# Patient Record
Sex: MALE | Race: ASIAN | NOT HISPANIC OR LATINO
[De-identification: names, ages, dates, MRNs, and addresses within clinical notes are randomized per-mention and may not be internally consistent; named-entity substitution may affect disease eponyms.]

---

## 2024-03-04 ENCOUNTER — TRANSCRIPTION ENCOUNTER (OUTPATIENT)
Age: 74
End: 2024-03-04

## 2024-03-05 ENCOUNTER — RESULT REVIEW (OUTPATIENT)
Age: 74
End: 2024-03-05

## 2024-03-05 ENCOUNTER — EMERGENCY (EMERGENCY)
Facility: HOSPITAL | Age: 74
LOS: 1 days | Discharge: HOME | End: 2024-03-05
Attending: EMERGENCY MEDICINE | Admitting: STUDENT IN AN ORGANIZED HEALTH CARE EDUCATION/TRAINING PROGRAM
Payer: MEDICARE

## 2024-03-05 VITALS
HEART RATE: 92 BPM | SYSTOLIC BLOOD PRESSURE: 114 MMHG | DIASTOLIC BLOOD PRESSURE: 77 MMHG | RESPIRATION RATE: 16 BRPM | OXYGEN SATURATION: 95 % | TEMPERATURE: 98 F

## 2024-03-05 LAB
ADD ON TEST-SPECIMEN IN LAB: SIGNIFICANT CHANGE UP
ALBUMIN SERPL ELPH-MCNC: 4.5 G/DL — SIGNIFICANT CHANGE UP (ref 3.3–5)
ALP SERPL-CCNC: 66 U/L — SIGNIFICANT CHANGE UP (ref 40–120)
ALT FLD-CCNC: 31 U/L — SIGNIFICANT CHANGE UP (ref 4–41)
ANION GAP SERPL CALC-SCNC: 14 MMOL/L — SIGNIFICANT CHANGE UP (ref 7–14)
AST SERPL-CCNC: 30 U/L — SIGNIFICANT CHANGE UP (ref 4–40)
BASOPHILS # BLD AUTO: 0.04 K/UL — SIGNIFICANT CHANGE UP (ref 0–0.2)
BASOPHILS NFR BLD AUTO: 0.3 % — SIGNIFICANT CHANGE UP (ref 0–2)
BILIRUB SERPL-MCNC: 1.2 MG/DL — SIGNIFICANT CHANGE UP (ref 0.2–1.2)
BUN SERPL-MCNC: 19 MG/DL — SIGNIFICANT CHANGE UP (ref 7–23)
CALCIUM SERPL-MCNC: 9.7 MG/DL — SIGNIFICANT CHANGE UP (ref 8.4–10.5)
CHLORIDE SERPL-SCNC: 92 MMOL/L — LOW (ref 98–107)
CO2 SERPL-SCNC: 24 MMOL/L — SIGNIFICANT CHANGE UP (ref 22–31)
CREAT SERPL-MCNC: 0.8 MG/DL — SIGNIFICANT CHANGE UP (ref 0.5–1.3)
EGFR: 95 ML/MIN/1.73M2 — SIGNIFICANT CHANGE UP
EOSINOPHIL # BLD AUTO: 0.02 K/UL — SIGNIFICANT CHANGE UP (ref 0–0.5)
EOSINOPHIL NFR BLD AUTO: 0.2 % — SIGNIFICANT CHANGE UP (ref 0–6)
GLUCOSE SERPL-MCNC: 118 MG/DL — HIGH (ref 70–99)
HCT VFR BLD CALC: 49.2 % — SIGNIFICANT CHANGE UP (ref 39–50)
HGB BLD-MCNC: 16.6 G/DL — SIGNIFICANT CHANGE UP (ref 13–17)
IANC: 11.28 K/UL — HIGH (ref 1.8–7.4)
IMM GRANULOCYTES NFR BLD AUTO: 0.5 % — SIGNIFICANT CHANGE UP (ref 0–0.9)
LYMPHOCYTES # BLD AUTO: 0.98 K/UL — LOW (ref 1–3.3)
LYMPHOCYTES # BLD AUTO: 7.4 % — LOW (ref 13–44)
MCHC RBC-ENTMCNC: 32.2 PG — SIGNIFICANT CHANGE UP (ref 27–34)
MCHC RBC-ENTMCNC: 33.7 GM/DL — SIGNIFICANT CHANGE UP (ref 32–36)
MCV RBC AUTO: 95.5 FL — SIGNIFICANT CHANGE UP (ref 80–100)
MONOCYTES # BLD AUTO: 0.84 K/UL — SIGNIFICANT CHANGE UP (ref 0–0.9)
MONOCYTES NFR BLD AUTO: 6.3 % — SIGNIFICANT CHANGE UP (ref 2–14)
NEUTROPHILS # BLD AUTO: 11.28 K/UL — HIGH (ref 1.8–7.4)
NEUTROPHILS NFR BLD AUTO: 85.3 % — HIGH (ref 43–77)
NRBC # BLD: 0 /100 WBCS — SIGNIFICANT CHANGE UP (ref 0–0)
NRBC # FLD: 0 K/UL — SIGNIFICANT CHANGE UP (ref 0–0)
PLATELET # BLD AUTO: 221 K/UL — SIGNIFICANT CHANGE UP (ref 150–400)
POTASSIUM SERPL-MCNC: 4.4 MMOL/L — SIGNIFICANT CHANGE UP (ref 3.5–5.3)
POTASSIUM SERPL-SCNC: 4.4 MMOL/L — SIGNIFICANT CHANGE UP (ref 3.5–5.3)
PROT SERPL-MCNC: 7.6 G/DL — SIGNIFICANT CHANGE UP (ref 6–8.3)
RBC # BLD: 5.15 M/UL — SIGNIFICANT CHANGE UP (ref 4.2–5.8)
RBC # FLD: 12 % — SIGNIFICANT CHANGE UP (ref 10.3–14.5)
SODIUM SERPL-SCNC: 130 MMOL/L — LOW (ref 135–145)
TROPONIN T, HIGH SENSITIVITY RESULT: 12 NG/L — SIGNIFICANT CHANGE UP
TROPONIN T, HIGH SENSITIVITY RESULT: 16 NG/L — SIGNIFICANT CHANGE UP
WBC # BLD: 13.23 K/UL — HIGH (ref 3.8–10.5)
WBC # FLD AUTO: 13.23 K/UL — HIGH (ref 3.8–10.5)

## 2024-03-05 PROCEDURE — 99283 EMERGENCY DEPT VISIT LOW MDM: CPT

## 2024-03-05 PROCEDURE — 70450 CT HEAD/BRAIN W/O DYE: CPT | Mod: 26,MC

## 2024-03-05 PROCEDURE — 71046 X-RAY EXAM CHEST 2 VIEWS: CPT | Mod: 26

## 2024-03-05 PROCEDURE — 70450 CT HEAD/BRAIN W/O DYE: CPT | Mod: 26,MC,77

## 2024-03-05 PROCEDURE — 93306 TTE W/DOPPLER COMPLETE: CPT | Mod: 26

## 2024-03-05 PROCEDURE — 99223 1ST HOSP IP/OBS HIGH 75: CPT

## 2024-03-05 PROCEDURE — 93010 ELECTROCARDIOGRAM REPORT: CPT

## 2024-03-05 RX ORDER — LEVOTHYROXINE SODIUM 125 MCG
100 TABLET ORAL DAILY
Refills: 0 | Status: DISCONTINUED | OUTPATIENT
Start: 2024-03-05 | End: 2024-03-08

## 2024-03-05 RX ORDER — SODIUM CHLORIDE 9 MG/ML
1000 INJECTION INTRAMUSCULAR; INTRAVENOUS; SUBCUTANEOUS ONCE
Refills: 0 | Status: COMPLETED | OUTPATIENT
Start: 2024-03-05 | End: 2024-03-05

## 2024-03-05 RX ORDER — ATORVASTATIN CALCIUM 80 MG/1
20 TABLET, FILM COATED ORAL AT BEDTIME
Refills: 0 | Status: DISCONTINUED | OUTPATIENT
Start: 2024-03-05 | End: 2024-03-08

## 2024-03-05 RX ORDER — DILTIAZEM HCL 120 MG
120 CAPSULE, EXT RELEASE 24 HR ORAL DAILY
Refills: 0 | Status: DISCONTINUED | OUTPATIENT
Start: 2024-03-05 | End: 2024-03-08

## 2024-03-05 RX ORDER — FINASTERIDE 5 MG/1
5 TABLET, FILM COATED ORAL DAILY
Refills: 0 | Status: DISCONTINUED | OUTPATIENT
Start: 2024-03-05 | End: 2024-03-08

## 2024-03-05 RX ORDER — TETANUS TOXOID, REDUCED DIPHTHERIA TOXOID AND ACELLULAR PERTUSSIS VACCINE, ADSORBED 5; 2.5; 8; 8; 2.5 [IU]/.5ML; [IU]/.5ML; UG/.5ML; UG/.5ML; UG/.5ML
0.5 SUSPENSION INTRAMUSCULAR ONCE
Refills: 0 | Status: COMPLETED | OUTPATIENT
Start: 2024-03-05 | End: 2024-03-05

## 2024-03-05 RX ORDER — ALLOPURINOL 300 MG
300 TABLET ORAL DAILY
Refills: 0 | Status: DISCONTINUED | OUTPATIENT
Start: 2024-03-05 | End: 2024-03-08

## 2024-03-05 RX ORDER — ACETAMINOPHEN 500 MG
975 TABLET ORAL ONCE
Refills: 0 | Status: COMPLETED | OUTPATIENT
Start: 2024-03-05 | End: 2024-03-05

## 2024-03-05 RX ORDER — ACETAMINOPHEN 500 MG
650 TABLET ORAL ONCE
Refills: 0 | Status: COMPLETED | OUTPATIENT
Start: 2024-03-05 | End: 2024-03-05

## 2024-03-05 RX ORDER — MONTELUKAST 4 MG/1
10 TABLET, CHEWABLE ORAL DAILY
Refills: 0 | Status: DISCONTINUED | OUTPATIENT
Start: 2024-03-05 | End: 2024-03-08

## 2024-03-05 RX ORDER — LISINOPRIL 2.5 MG/1
20 TABLET ORAL DAILY
Refills: 0 | Status: DISCONTINUED | OUTPATIENT
Start: 2024-03-05 | End: 2024-03-08

## 2024-03-05 RX ADMIN — Medication 300 MILLIGRAM(S): at 22:54

## 2024-03-05 RX ADMIN — MONTELUKAST 10 MILLIGRAM(S): 4 TABLET, CHEWABLE ORAL at 22:55

## 2024-03-05 RX ADMIN — FINASTERIDE 5 MILLIGRAM(S): 5 TABLET, FILM COATED ORAL at 22:55

## 2024-03-05 RX ADMIN — ATORVASTATIN CALCIUM 20 MILLIGRAM(S): 80 TABLET, FILM COATED ORAL at 22:54

## 2024-03-05 RX ADMIN — SODIUM CHLORIDE 1000 MILLILITER(S): 9 INJECTION INTRAMUSCULAR; INTRAVENOUS; SUBCUTANEOUS at 09:08

## 2024-03-05 RX ADMIN — TETANUS TOXOID, REDUCED DIPHTHERIA TOXOID AND ACELLULAR PERTUSSIS VACCINE, ADSORBED 0.5 MILLILITER(S): 5; 2.5; 8; 8; 2.5 SUSPENSION INTRAMUSCULAR at 06:51

## 2024-03-05 RX ADMIN — SODIUM CHLORIDE 1000 MILLILITER(S): 9 INJECTION INTRAMUSCULAR; INTRAVENOUS; SUBCUTANEOUS at 10:32

## 2024-03-05 RX ADMIN — Medication 975 MILLIGRAM(S): at 04:41

## 2024-03-05 RX ADMIN — Medication 650 MILLIGRAM(S): at 19:32

## 2024-03-05 RX ADMIN — Medication 650 MILLIGRAM(S): at 20:30

## 2024-03-05 NOTE — ED ADULT NURSE NOTE - NSFALLRISKINTERV_ED_ALL_ED

## 2024-03-05 NOTE — ED PROVIDER NOTE - OBJECTIVE STATEMENT
73M w/ hx DM, htn, hld, gout presenting with syncope. Pt woke up at 2:30a to get food, was slicing bread when he syncopized. Unwitnessed fall. Pt found on floor by family, dazed however awake. No prodrome. No fever, cp, sob, abd pain, n/v. Reports mild neck stiffness when moving however no neck or back pain. Has small abrasions to bl eyelids without large lac. No ASA or AC use. 73M w/ hx DM, htn, hld, gout presenting with syncope. Pt woke up at 2:30a to get food, was slicing bread when he syncopized. Unwitnessed fall. Pt found on floor by family, dazed however awake. No prodrome. No fever, cp, sob, abd pain, n/v. Reports mild neck stiffness when moving however no neck or back pain. Has L upper eyelid stellate lac, R upper eyelid abrasion. No ASA or AC use.

## 2024-03-05 NOTE — ED PROVIDER NOTE - ATTENDING CONTRIBUTION TO CARE
74 y/o M with h/o HTN, hyperlipidemia, DM on oral meds, gout here after a syncopal episode.  Pt reports in the middle of the night he woke up feeling hungry.  He went to the kitchen to make himself some food - while cutting the bread he passed out.  Pt denies any prodromal sxs, states he just recalls cutting the bread and then waking up on the ground.  He was found on the ground by his son who heard him fall.  The son called his wife who came down to check on him - he was groggy but waking up at the time and was soon back to baseline.  Denies tongue biting or incontinence.  Pt sustained lacs to bilateral eyes.  No vision changes.  Denies ha, weakness, cp, sob, palp.    Well appearing, lying comfortably in stretcher, awake and alert, nontoxic.  VSS.  NCAT (+)superficial abrasion to R eyelid, (+)1cm stellate laceration, not through and through to L eyelid, EOMI no proptosis PERRL grossly normal vision.  Neck supple, no midline spinal tenderness or palpable deformity.  Lungs cta bl.  Cards nl S1/S2, RRR, no MRG.  Abd soft ntnd no rebound or guarding.  No pedal edema or calf tenderness.  Pelvis is stable, all extremities intact with FROM, no gross deformities.  No focal neuro deficits.    Pt with unprovoked syncope - concern for primary cardiac event, anemia, metabolic disturbance.  No resp sxs to suggest pe.  Will obtain CT head given trauma, update tdap, c-collar cleared upon exam, consider cdu vs admission for syncope work-up.

## 2024-03-05 NOTE — CONSULT NOTE ADULT - ASSESSMENT
73M w/ hx DM, HTN, HLD, gout presented to the ED s/p syncopal episode. CT head impression: Mild asymmetric thickening of the right tentorium may reflect acute subdural space hemorrhage or sequela of previous inflammatory or other insult

## 2024-03-05 NOTE — ED PROVIDER NOTE - PHYSICAL EXAMINATION
General appearance: NAD, conversant, afebrile    Eyes: anicteric sclerae, MICHELINE, EOMI   HENT: oropharynx clear, MMM and no ulcerations, no pharyngeal erythema or exudate   Neck: Trachea midline; Full range of motion, supple   Pulm: CTA bl, normal respiratory effort and no intercostal retractions, normal work of breathing   CV: RRR, No murmurs, rubs, or gallops.    Abdomen: Soft, non-tender, non-distended; no guarding or rebound   Extremities: No peripheral edema or extremity lymphadenopathy. 5/5 strength in all four extremities. No ttp. Full rom   Skin: bl upper eyelid abrasions, no lac.   Psych: Appropriate affect, cooperative; alert and oriented to person, place and time General appearance: NAD, conversant, afebrile    Eyes: anicteric sclerae, MICHELINE, EOMI   HENT: oropharynx clear, MMM and no ulcerations, no pharyngeal erythema or exudate   Neck: Trachea midline; Full range of motion, supple   Pulm: CTA bl, normal respiratory effort and no intercostal retractions, normal work of breathing   CV: RRR, No murmurs, rubs, or gallops.    Abdomen: Soft, non-tender, non-distended; no guarding or rebound   Extremities: No peripheral edema or extremity lymphadenopathy. 5/5 strength in all four extremities. No ttp. Full rom   Skin: L upper eyelid stellate lac, R upper eyelid abrasion   Psych: Appropriate affect, cooperative; alert and oriented to person, place and time

## 2024-03-05 NOTE — ED PROVIDER NOTE - PROGRESS NOTE DETAILS
Joann Pierre- plastics paged for eyelid lac Dr. García: Pt was signed out to me awaiting head CT and plastics for lid lac repair. Pt resting in NAD. Neurosurgery PA consulted for "trace volume of 6 acute subdural space hemorrhage" seen on head CT, pt reporting mild HA declining pain meds on reassessment, interval 4hr CT head ordered. Pt agreeable to CDU stay for syncopal workup. - Allison Jimenez, PGY-3

## 2024-03-05 NOTE — ED CDU PROVIDER INITIAL DAY NOTE - OBJECTIVE STATEMENT
72 y/o male with PMHx of HTN, HLD, DM type 2, and Gout who presented to the ED for episode of syncope today. Pt states he woke up early in the morning and was hungry and walked to the kitchen to make himself food when he passed out. Pt did hit his head and had a laceration to left eyelid that required repair with plastics. Pt denies any fever, chills, nausea, vomiting, SOB, chest pain, or abd pain. Pt sent to OBS for tele monitoring and echo.

## 2024-03-05 NOTE — ED ADULT NURSE NOTE - OBJECTIVE STATEMENT
Patient received in spot 6a. A&O4. ambulatory. Past medical history of thyroidectomy, HTN, HLD, DM. s/p unwitnessed syncopal episode has a laceration on each eyelid has pain to back and neck ,cervical collar applied by EMS. Patient states going to the kitchen when his son found him on the floor. Patient denies blood thinner use, lip biting, urinating himself, SOB, chest pain, N/V/D, fevers. Patient appears well. No neuro deficits noted. Respirations even and unlabored. Family at bedside. NSR On monitor.

## 2024-03-05 NOTE — CONSULT NOTE ADULT - NS ATTEND AMEND GEN_ALL_CORE FT
73M w/ hx DM, HTN, HLD, gout presented to the ED s/p syncopal episode. CT head impression: Mild asymmetric thickening of the right tentorium may reflect acute subdural space hemorrhage or sequela of previous inflammatory or other insult    - No acute neurosurgery intervention  - Repeat CT head in 4 hours, if stable may follow up as out patient with Dr. Salguero in 2 wks  -Hold all blood thinners

## 2024-03-05 NOTE — ED ADULT TRIAGE NOTE - CHIEF COMPLAINT QUOTE
alert oriented s/p unwitnessed syncopal episode has a laceration on each eyelid  has pain to back and neck  cervical collar applied by EMS  denies blood thinners hx DM2 HTN high cholesterol asthma

## 2024-03-05 NOTE — ED PROVIDER NOTE - CLINICAL SUMMARY MEDICAL DECISION MAKING FREE TEXT BOX
73M w/ hx DM, htn, hld, gout presenting with syncope. Pt woke up at 2:30a to get food, was slicing bread when he syncopized. Unwitnessed fall. Pt found on floor by family, dazed however awake. No prodrome. No fever, cp, sob, abd pain, n/v. Reports mild neck stiffness when moving however no neck or back pain. Has small abrasions to bl eyelids without large lac. No ASA or AC use. Exam shows bl upper eyelid abrasions, no lacs. eomi. no midline spine ttp. will get labs for syncope, cardiac w/u. will get CTH 73M w/ hx DM, htn, hld, gout presenting with syncope. Pt woke up at 2:30a to get food, was slicing bread when he syncopized. Unwitnessed fall. Pt found on floor by family, dazed however awake. No prodrome. No fever, cp, sob, abd pain, n/v. Reports mild neck stiffness when moving however no neck or back pain. Exam shows L upper eyelid stellate lac, R upper eyelid abrasion. eomi. no midline spine ttp. will get labs for syncope, cardiac w/u. will get CTH

## 2024-03-05 NOTE — ED CDU PROVIDER INITIAL DAY NOTE - ATTENDING APP SHARED VISIT CONTRIBUTION OF CARE
Pt was seen and evaluated by me. Pt is a 72 y/o male with PMHx of HTN, HLD, DM type 2, and Gout who presented to the ED for episode of syncope today. Pt states he woke up early in the morning and was hungry and walked to the kitchen to make himself food when he passed out. Pt did hit his head and had a laceration to left eyelid that required repair with plastics. Pt denies any fever, chills, nausea, vomiting, SOB, chest pain, or abd pain. Pt sent to OBS for tele monitoring and echo.   VITALS: Vitals have been reviewed.  GEN APPEARANCE: Alert and cooperative, non-toxic appearing and in NAD  HEAD: Atraumatic, normocephalic.   EYES: PERRL, EOMI.   EARS: Gross hearing intact.   NOSE: No nasal discharge.   THROAT: MMM. Oral cavity and pharynx normal.   CV: RRR, S1S2, no c/r/m/g. No cyanosis or pallor.   LUNGS: CTAB. No wheezing. No rales. No rhonchi. No diminished breath sounds.   ABDOMEN: Soft, NTND. No guarding or rebound.   MSK/EXT: Spine appears normal, no spine point tenderness. No CVA  NEURO: Alert, follows commands. Speech normal. Sensation and motor normal x4 extremities.   SKIN: Laceration repair to left eyelid  72 y/o male with PMHx of HTN, HLD, DM type 2, and Gout who presented to the ED for episode of syncope today.  Concern for syncope/eyelid laceration  Sent to OBS for tele and echo

## 2024-03-05 NOTE — ED CDU PROVIDER INITIAL DAY NOTE - NSICDXPASTMEDICALHX_GEN_ALL_CORE_FT
PAST MEDICAL HISTORY:  H/O: HTN (hypertension)     HLD (hyperlipidemia)     Type 2 diabetes mellitus

## 2024-03-05 NOTE — ED CDU PROVIDER INITIAL DAY NOTE - CLINICAL SUMMARY MEDICAL DECISION MAKING FREE TEXT BOX
74 y/o male with PMHx of HTN, HLD, DM type 2, and Gout who presented to the ED for episode of syncope today.  Concern for syncope/eyelid laceration  Sent to OBS for tele and echo

## 2024-03-05 NOTE — ED ADULT NURSE REASSESSMENT NOTE - NS ED NURSE REASSESS COMMENT FT1
Received patient from night RN. Patient is A/O x 4, NAD, RR even and unlabored, states that his headache was resolved from earlier, pending plastic surgeon for b/l eyelid laceration repair, no c/o pain, plan of care reviewed, safety maintained, will continue to monitor
Patient is resting comfortably at this time with no c/o pain. A/O x 4, NSR on CM, RR even and unlabored, pending imaging results. plan of care reviewed, safety maintained ,will continue to monitor patient
Patient in spot 6a. pt remains at baseline mental status, RR even unlabored completing full sentences. pt resting in stretcher comfortably at this time, no new complaints offered. stretcher lowest position siderails up safety measures in place. Patient pending CT.

## 2024-03-05 NOTE — CONSULT NOTE ADULT - SUBJECTIVE AND OBJECTIVE BOX
SUMMER SIM 73y ,Male    HPI: 73M w/ hx DM, HTN, HLD and gout presenting with syncope. Patient woke up at 2:30am to get food, was slicing bread when he synopsized. Unwitnessed fall. (time frame of fall)  Pt found on floor by family, dazed however awake. No prodrome. No fever, CP, SOB, abd pain, n/v. Reports mild neck stiffness when moving however no neck or back pain. Has L upper eyelid stellate lac, R upper eyelid abrasion. No ASA or AC use.    PAST MEDICAL & SURGICAL HISTORY:  HTN  DM  HLD  GOUT    Allergies  -penicillins (Other)    MEDICATIONS  (STANDING):  sodium chloride 0.9% Bolus 1000 milliLiter(s) IV Bolus once    MEDICATIONS  (PRN):    Vital Signs Last 24 Hrs  T(C): 37 (05 Mar 2024 06:04), Max: 37 (05 Mar 2024 06:04)  T(F): 98.6 (05 Mar 2024 06:04), Max: 98.6 (05 Mar 2024 06:04)  HR: 95 (05 Mar 2024 06:04) (92 - 95)  BP: 116/88 (05 Mar 2024 06:04) (114/77 - 116/88)  RR: 20 (05 Mar 2024 06:04) (16 - 20)  SpO2: 98% (05 Mar 2024 06:04) (95% - 98%)    Parameters below as of 05 Mar 2024 06:04  Patient On (Oxygen Delivery Method): room air    PE:  AA&0 x  Motor:  Sensory:      LABS:                        16.6   13.23 )-----------( 221      ( 05 Mar 2024 03:55 )             49.2     03-05    130<L>  |  92<L>  |  19  ----------------------------<  118<H>  4.4   |  24  |  0.80    Ca    9.7      05 Mar 2024 03:55    TPro  7.6  /  Alb  4.5  /  TBili  1.2  /  DBili  x   /  AST  30  /  ALT  31  /  AlkPhos  66  03-05      Urinalysis Basic - ( 05 Mar 2024 03:55 )    Color: x / Appearance: x / SG: x / pH: x  Gluc: 118 mg/dL / Ketone: x  / Bili: x / Urobili: x   Blood: x / Protein: x / Nitrite: x   Leuk Esterase: x / RBC: x / WBC x   Sq Epi: x / Non Sq Epi: x / Bacteria: x    Imaging:  < from: CT Head No Cont (03.05.24 @ 07:17) >  IMPRESSION:    1. Mild asymmetric thickening of the right tentorium may reflect a trace   volume of 6 acute subdural space hemorrhage or sequela of previous   inflammatory or other insult. No comparison imaging is available to   differentiate. Recommend short-term follow-up reevaluation    2. Ischemic white matter disease and atrophy typical for age    3. Intracranial atherosclerosis    < end of copied text >     SUMMER SIM 73y ,Male    HPI: 73M w/ hx DM, HTN, HLD and gout presenting with syncope. Patient woke up at 2:30am to get food, was slicing bread when he synopsized. Unwitnessed fall. (time frame of fall)  Pt found on floor by family, dazed however awake. No prodrome. No fever, CP, SOB, abd pain, n/v. Reports mild neck stiffness when moving however no neck or back pain. Has L upper eyelid stellate lac, R upper eyelid abrasion. No ASA or AC use.    On PE_:patient denies HA, N/V, visual disturbances, left eye lid sutured in ER.     PAST MEDICAL & SURGICAL HISTORY:  HTN  DM  HLD  GOUT    Allergies  -penicillins (Other)    MEDICATIONS  (STANDING):  sodium chloride 0.9% Bolus 1000 milliLiter(s) IV Bolus once    MEDICATIONS  (PRN):    Vital Signs Last 24 Hrs  T(C): 37 (05 Mar 2024 06:04), Max: 37 (05 Mar 2024 06:04)  T(F): 98.6 (05 Mar 2024 06:04), Max: 98.6 (05 Mar 2024 06:04)  HR: 95 (05 Mar 2024 06:04) (92 - 95)  BP: 116/88 (05 Mar 2024 06:04) (114/77 - 116/88)  RR: 20 (05 Mar 2024 06:04) (16 - 20)  SpO2: 98% (05 Mar 2024 06:04) (95% - 98%)    Parameters below as of 05 Mar 2024 06:04  Patient On (Oxygen Delivery Method): room air    PE:  AA&0 x 3, CN 2-12 grossly intact, speech clear, follows commands  b/l eyelid lacerations, left lid required sutures  Motor: strength 5/5 thoughout  Sensory: SILT  no drift      LABS:                        16.6   13.23 )-----------( 221      ( 05 Mar 2024 03:55 )             49.2     03-05    130<L>  |  92<L>  |  19  ----------------------------<  118<H>  4.4   |  24  |  0.80    Ca    9.7      05 Mar 2024 03:55    TPro  7.6  /  Alb  4.5  /  TBili  1.2  /  DBili  x   /  AST  30  /  ALT  31  /  AlkPhos  66  03-05      Urinalysis Basic - ( 05 Mar 2024 03:55 )    Color: x / Appearance: x / SG: x / pH: x  Gluc: 118 mg/dL / Ketone: x  / Bili: x / Urobili: x   Blood: x / Protein: x / Nitrite: x   Leuk Esterase: x / RBC: x / WBC x   Sq Epi: x / Non Sq Epi: x / Bacteria: x    Imaging:  < from: CT Head No Cont (03.05.24 @ 07:17) >  IMPRESSION:    1. Mild asymmetric thickening of the right tentorium may reflect a trace   volume of 6 acute subdural space hemorrhage or sequela of previous   inflammatory or other insult. No comparison imaging is available to   differentiate. Recommend short-term follow-up reevaluation    2. Ischemic white matter disease and atrophy typical for age    3. Intracranial atherosclerosis    < end of copied text >     SUMMER SIM 73y ,Male    HPI: 73M w/ hx DM, HTN, HLD and gout presenting with syncope. Patient woke up at 2:30am to get food, was slicing bread when he synopsized. Unwitnessed fall.  Pt found on floor by Son. dazed, however awake. Son called EMS, no prodrome. No fever, CP, SOB, abd pain, n/v. Reports mild neck stiffness when moving however no neck or back pain. Has L upper eyelid stellate lac, R upper eyelid abrasion. No ASA or AC use.    On PE_:patient denies HA, N/V, visual disturbances, left eye lid sutured in ER.     PAST MEDICAL & SURGICAL HISTORY:  HTN  DM  HLD  GOUT    Allergies  -penicillins (Other)    MEDICATIONS  (STANDING):  sodium chloride 0.9% Bolus 1000 milliLiter(s) IV Bolus once    MEDICATIONS  (PRN):    Vital Signs Last 24 Hrs  T(C): 37 (05 Mar 2024 06:04), Max: 37 (05 Mar 2024 06:04)  T(F): 98.6 (05 Mar 2024 06:04), Max: 98.6 (05 Mar 2024 06:04)  HR: 95 (05 Mar 2024 06:04) (92 - 95)  BP: 116/88 (05 Mar 2024 06:04) (114/77 - 116/88)  RR: 20 (05 Mar 2024 06:04) (16 - 20)  SpO2: 98% (05 Mar 2024 06:04) (95% - 98%)    Parameters below as of 05 Mar 2024 06:04  Patient On (Oxygen Delivery Method): room air    PE:  AA&0 x 3, CN 2-12 grossly intact, speech clear, follows commands  b/l eyelid lacerations, left lid required sutures  Motor: strength 5/5 thoughout  Sensory: SILT  no drift      LABS:                        16.6   13.23 )-----------( 221      ( 05 Mar 2024 03:55 )             49.2     03-05    130<L>  |  92<L>  |  19  ----------------------------<  118<H>  4.4   |  24  |  0.80    Ca    9.7      05 Mar 2024 03:55    TPro  7.6  /  Alb  4.5  /  TBili  1.2  /  DBili  x   /  AST  30  /  ALT  31  /  AlkPhos  66  03-05      Urinalysis Basic - ( 05 Mar 2024 03:55 )    Color: x / Appearance: x / SG: x / pH: x  Gluc: 118 mg/dL / Ketone: x  / Bili: x / Urobili: x   Blood: x / Protein: x / Nitrite: x   Leuk Esterase: x / RBC: x / WBC x   Sq Epi: x / Non Sq Epi: x / Bacteria: x    Imaging:  < from: CT Head No Cont (03.05.24 @ 07:17) >  IMPRESSION:    1. Mild asymmetric thickening of the right tentorium may reflect a trace   volume of 6 acute subdural space hemorrhage or sequela of previous   inflammatory or other insult. No comparison imaging is available to   differentiate. Recommend short-term follow-up reevaluation    2. Ischemic white matter disease and atrophy typical for age    3. Intracranial atherosclerosis    < end of copied text >

## 2024-03-06 VITALS
DIASTOLIC BLOOD PRESSURE: 62 MMHG | HEART RATE: 75 BPM | RESPIRATION RATE: 16 BRPM | OXYGEN SATURATION: 97 % | SYSTOLIC BLOOD PRESSURE: 112 MMHG | TEMPERATURE: 98 F

## 2024-03-06 PROCEDURE — 99238 HOSP IP/OBS DSCHRG MGMT 30/<: CPT

## 2024-03-06 RX ADMIN — Medication 100 MICROGRAM(S): at 06:58

## 2024-03-06 NOTE — ED CDU PROVIDER DISPOSITION NOTE - PATIENT PORTAL LINK FT
You can access the FollowMyHealth Patient Portal offered by [facility] by registering at the following website: [email or web address]. By joining [facility]’s FollowMyHealth portal, you will also be able to view your health information using other applications (apps) compatible with our system.

## 2024-03-06 NOTE — ED CDU PROVIDER SUBSEQUENT DAY NOTE - ATTENDING APP SHARED VISIT CONTRIBUTION OF CARE
I (Yfn) agree with above, I performed a history and physical. Counseled juana medical staff, physician assistant, and/or medical student on medical decision making as documented. Medical decisions and treatment interventions were made in real time during the patient encounter. Additionally and/or with the following exceptions:

## 2024-03-06 NOTE — ED CDU PROVIDER DISPOSITION NOTE - NSFOLLOWUPINSTRUCTIONS_ED_ALL_ED_FT
Rest, stay hydrated, take all meds as previously prescribed, Followup with your PMD/Cardiology (Dr Solano) within 2 days for post hospital visit. Show your doctor and specialists all copies of labs given to you. Return for worsening symptoms, ex. fever, shortness of breath, chest pain, dizziness, palpitations, etc. Please read all the patient handouts.

## 2024-03-06 NOTE — ED CDU PROVIDER SUBSEQUENT DAY NOTE - CLINICAL SUMMARY MEDICAL DECISION MAKING FREE TEXT BOX
73-year-old male, past medical history of hypertension, diabetes, gout, presenting after a syncopal episode accepted to CDU for telemetry monitoring.  Initial CT demonstrating small subdural hematoma, repeat CT scan demonstrating no expansion of bleed, determined to be stable repeat scan.  Echocardiogram with no abnormal findings, patient is just pending telemetry doc evaluation.

## 2024-03-06 NOTE — ED CDU PROVIDER DISPOSITION NOTE - CLINICAL COURSE
73-year-old male, past medical history of hypertension, diabetes, gout, presenting after a syncopal episode accepted to CDU for telemetry monitoring.  Initial CT demonstrating small subdural hematoma, repeat CT scan demonstrating no expansion of bleed, determined to be stable repeat scan.  Cleared by neurosx. Echocardiogram with no abnormal findings. Upon am reassessment, pt notes feels well, ambulating without difficulty, no tele events overnight, discussed echo results with Dr Solano over the phone who states pt is cleared for dc and can f/u with him outpt. 165.1

## 2024-03-06 NOTE — ED CDU PROVIDER DISPOSITION NOTE - CARE PROVIDER_API CALL
Harvinder Solano  Cardiology  [address] [place] [address], [facility] [address]  [place] [address]  Phone: [phone]  Fax: [phone]  Follow Up Time:
